# Patient Record
Sex: MALE | Employment: FULL TIME | URBAN - METROPOLITAN AREA
[De-identification: names, ages, dates, MRNs, and addresses within clinical notes are randomized per-mention and may not be internally consistent; named-entity substitution may affect disease eponyms.]

---

## 2021-04-05 ENCOUNTER — OFFICE VISIT (OUTPATIENT)
Dept: SURGERY | Facility: CLINIC | Age: 46
End: 2021-04-05
Payer: COMMERCIAL

## 2021-04-05 VITALS
WEIGHT: 155.63 LBS | BODY MASS INDEX: 23.05 KG/M2 | DIASTOLIC BLOOD PRESSURE: 88 MMHG | HEIGHT: 69 IN | SYSTOLIC BLOOD PRESSURE: 138 MMHG | TEMPERATURE: 97 F | HEART RATE: 105 BPM

## 2021-04-05 DIAGNOSIS — K92.2 INTESTINAL BLEEDING: Primary | ICD-10-CM

## 2021-04-05 DIAGNOSIS — Z01.818 PRE-OP TESTING: ICD-10-CM

## 2021-04-05 DIAGNOSIS — K64.2 PROLAPSED INTERNAL HEMORRHOIDS, GRADE 3: ICD-10-CM

## 2021-04-05 PROBLEM — K62.0 ANAL POLYP: Status: ACTIVE | Noted: 2021-04-05

## 2021-04-05 PROCEDURE — 99204 OFFICE O/P NEW MOD 45 MIN: CPT | Performed by: COLON & RECTAL SURGERY

## 2021-04-05 PROCEDURE — 3075F SYST BP GE 130 - 139MM HG: CPT | Performed by: COLON & RECTAL SURGERY

## 2021-04-05 PROCEDURE — 3008F BODY MASS INDEX DOCD: CPT | Performed by: COLON & RECTAL SURGERY

## 2021-04-05 PROCEDURE — 3079F DIAST BP 80-89 MM HG: CPT | Performed by: COLON & RECTAL SURGERY

## 2021-04-05 PROCEDURE — 46600 DIAGNOSTIC ANOSCOPY SPX: CPT | Performed by: COLON & RECTAL SURGERY

## 2021-04-05 RX ORDER — SODIUM, POTASSIUM,MAG SULFATES 17.5-3.13G
SOLUTION, RECONSTITUTED, ORAL ORAL
Qty: 1 KIT | Refills: 0 | Status: SHIPPED | OUTPATIENT
Start: 2021-04-05

## 2021-04-05 NOTE — H&P
New Patient Visit Note       Active Problems      1. Intestinal bleeding    2. Prolapsed internal hemorrhoids, grade 3    3.  Pre-op testing        Chief Complaint   Patient presents with:  Colonoscopy: Colonoscopy and anal abscess - h/o of abscess for 3 yr abdominal operations. I acted as a scribe in this encounter. The physician obtained a history, independently performed a physical exam, and developed an assessment and plan. The physician performed all medical decision making.     Alfred Puente P Problem Relation Age of Onset   • Hypertension Mother      Social History    Socioeconomic History      Marital status: Single      Spouse name: Not on file      Number of children: Not on file      Years of education: Not on file      Highest education Gastrointestinal: Negative for abdominal distention, abdominal pain, anal bleeding, blood in stool, constipation, diarrhea, nausea and vomiting. Genitourinary: Negative for difficulty urinating, dysuria, frequency and urgency.    Musculoskeletal: Larisa Fuss in the ventral area. Genitourinary:     Prostate: Enlarged. Not tender. Rectum: No mass, tenderness, anal fissure, external hemorrhoid or internal hemorrhoid. Normal anal tone.       Comments: No Prostate Nodule  Anal Sphincter Intact  No Pruritis An urge to have a bowel movement, however cannot have anything come out. He states he typically has 2-3 bowel movements per day, however they seem smaller. He also notes that he has some bleeding with his bowel movements as well.     The patient states that colonoscopy. All risks, benefits, complications and alternatives to the proposed procedure(s) were fully discussed with the patient. All questions from the patient were answered in detail.  A description of the procedure(s) possible outcomes was fully [facility]

## 2021-04-05 NOTE — PATIENT INSTRUCTIONS
The patient presents today in consultation for changes in bowel habits and a history of an anal canal polyp. The patient states that in 2015 he was evaluated for similar symptoms and was told that he had an anal canal polyp.   He had about a 2 cm polyp 4 was seen 5 years ago on his examination. The sensation that he is feeling of obstruction is from his internal hemorrhoids. These would be amenable to rubber band treatment.   We will be scheduling patient undergo for rubber band treatments at least 2 week

## 2021-04-06 NOTE — PROCEDURES
Procedure:  Anoscopy    Surgeon: Monse Britton    Anesthesia: None    Findings: See the progress note attached for all findings    Operative Summary: The patient was placed in a prone position on the proctoscopy table, the hips were flexed in the jackknife p

## 2021-04-29 ENCOUNTER — TELEPHONE (OUTPATIENT)
Dept: SURGERY | Facility: CLINIC | Age: 46
End: 2021-04-29

## 2021-04-29 NOTE — TELEPHONE ENCOUNTER
Pt called to CX Colonoscopy scheduled on 5-6 at [facility] - pt wants to see the results from banding his hemorrhoids first. His appt is scheduled for 5-10

## 2021-05-10 ENCOUNTER — OFFICE VISIT (OUTPATIENT)
Dept: SURGERY | Facility: CLINIC | Age: 46
End: 2021-05-10
Payer: COMMERCIAL

## 2021-05-10 DIAGNOSIS — K64.2 PROLAPSED INTERNAL HEMORRHOIDS, GRADE 3: Primary | ICD-10-CM

## 2021-05-10 DIAGNOSIS — K64.5 EXTERNAL HEMORRHOID, THROMBOSED: ICD-10-CM

## 2021-05-10 PROCEDURE — 46221 LIGATION OF HEMORRHOID(S): CPT | Performed by: COLON & RECTAL SURGERY

## 2021-05-10 RX ORDER — BUPROPION HYDROCHLORIDE 300 MG/1
300 TABLET ORAL EVERY 24 HOURS
COMMUNITY
Start: 2021-04-27

## 2021-05-10 NOTE — PROGRESS NOTES
Follow Up Visit Note       Active Problems      1. Prolapsed internal hemorrhoids, grade 3    2.  External hemorrhoid, thrombosed          Chief Complaint   Patient presents with:  Hemorrhoid Bandinst banding        History of Present Illness        All palpitations and leg swelling. Gastrointestinal: Positive for anal bleeding. Negative for abdominal distention, abdominal pain, blood in stool, constipation, diarrhea, nausea and vomiting.    Genitourinary: Negative for difficulty urinating, dysuria, freq the site of the thrombosis if the thrombosis propagates. No orders of the defined types were placed in this encounter. Imaging & Referrals   None    Follow Up  Return in about 2 weeks (around 5/24/2021).     Monse Britton MD

## 2021-05-10 NOTE — PATIENT INSTRUCTIONS
This patient is presenting for rubber band treatment. It is his first rubber band session. The patient presents to my office with a new onset of a right anterolateral thrombosed external hemorrhoid. It is asymptomatic.   We have decided to proceed with t

## 2021-05-10 NOTE — PROCEDURES
Pre op diagnosis: Internal Hemorrhoids    Post op diagnosis: Same    Procedure: Anoscopy with O-ring Rubber Band Ligation of Internal Hemorrhoids    Surgeon: Smiley Hawk MD    History of present illness:    This patient has internal hemorrhoids that are s How Severe Are Your Spot(S)?: mild Have Your Spot(S) Been Treated In The Past?: has not been treated Hpi Title: Evaluation of Skin Lesions

## (undated) NOTE — LETTER
21    Patient: Sai Licea  : 1975 Visit date: 2021    Dear  Doron Babcock MD    Thank you for referring Sai Licea to my practice. Please find my assessment and plan below.        Assessment   Intestinal bleeding  (primary enc size.  There are large grade 3 internal hemorrhoids and 3 classic locations. No evidence of any proctitis or active Crohn's disease. The polyp that was identified on previous examination is not present on today's examination.     I discussed with the karthik

## (undated) NOTE — LETTER
05/10/21    Patient: Danny Fairbanks  : 1975 Visit date: 5/10/2021    Dear  Yoandy Raines MD    Thank you for referring Danny Fairbanks to my practice. Please find my assessment and plan below.     Assessment   Prolapsed internal hemorrhoids, gra